# Patient Record
Sex: MALE | Race: WHITE
[De-identification: names, ages, dates, MRNs, and addresses within clinical notes are randomized per-mention and may not be internally consistent; named-entity substitution may affect disease eponyms.]

---

## 2018-02-07 ENCOUNTER — HOSPITAL ENCOUNTER (EMERGENCY)
Dept: HOSPITAL 4 - SED | Age: 48
Discharge: HOME | End: 2018-02-07
Payer: MEDICAID

## 2018-02-07 VITALS — SYSTOLIC BLOOD PRESSURE: 136 MMHG

## 2018-02-07 VITALS — WEIGHT: 198 LBS | BODY MASS INDEX: 28.35 KG/M2 | HEIGHT: 70 IN

## 2018-02-07 VITALS — SYSTOLIC BLOOD PRESSURE: 141 MMHG
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
## 2018-02-07 DIAGNOSIS — M77.9: ICD-10-CM
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
## 2018-02-07 DIAGNOSIS — S46.911A: Primary | ICD-10-CM
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
## 2018-02-07 PROCEDURE — 73080 X-RAY EXAM OF ELBOW: CPT

## 2018-02-07 PROCEDURE — 96372 THER/PROPH/DIAG INJ SC/IM: CPT

## 2018-02-07 PROCEDURE — 73030 X-RAY EXAM OF SHOULDER: CPT

## 2018-02-07 PROCEDURE — 99284 EMERGENCY DEPT VISIT MOD MDM: CPT

## 2018-02-07 NOTE — NUR
Patient given written and verbal discharge instructions and verbalizes 
understanding.  ER MD discussed with patient the results and treatment 
provided. Patient in stable condition. ID arm band removed. Rx of naproxen and 
flexeril given. Patient educated on pain management and to follow up with PMD. 
Pain Scale 0/10. Opportunity for questions provided and answered.

## 2018-02-07 NOTE — NUR
Patient AAO  X 4 sitting in bed, c/o right shoulder pain radiating to right 
elbow x 7 days, denies trauma, states he was stung by a bee prior to start of 
pain. No acute distress noted. Will continue to monitor.